# Patient Record
Sex: MALE | Race: AMERICAN INDIAN OR ALASKA NATIVE
[De-identification: names, ages, dates, MRNs, and addresses within clinical notes are randomized per-mention and may not be internally consistent; named-entity substitution may affect disease eponyms.]

---

## 2018-03-06 ENCOUNTER — HOSPITAL ENCOUNTER (EMERGENCY)
Dept: HOSPITAL 5 - ED | Age: 43
Discharge: HOME | End: 2018-03-06
Payer: SELF-PAY

## 2018-03-06 VITALS — DIASTOLIC BLOOD PRESSURE: 82 MMHG | SYSTOLIC BLOOD PRESSURE: 123 MMHG

## 2018-03-06 DIAGNOSIS — F17.200: ICD-10-CM

## 2018-03-06 DIAGNOSIS — H60.92: Primary | ICD-10-CM

## 2018-03-06 DIAGNOSIS — H66.92: ICD-10-CM

## 2018-03-06 PROCEDURE — 99282 EMERGENCY DEPT VISIT SF MDM: CPT

## 2018-03-06 NOTE — EMERGENCY DEPARTMENT REPORT
ED Fever HPI





- General


Chief Complaint: Fever


Stated Complaint: FLU LIKE SYMPTOMS


Time Seen by Provider: 03/06/18 19:22





- History of Present Illness


Initial Comments: 





Patient is a 42-year-old  male who is complaining of pain to 

his left ear.  Patient states he still started out with a mild cough 

nonproductive over the last several days his left ear is now hurting.  Patient 

is having 10 out of 10 pain he states that this some mild radiation into the 

left neck.  The patient denies any sore throat nausea vomiting or neck 

stiffness.


Fever Therapy PTA: cold remedies


Associated Symptoms: cough, diaphoresis.  denies: abdominal pain, chest pain, 

confusion, headache, muscle aches, nausea/vomiting, rash, shortness of breath, 

sore throat, stiff neck, syncope





ED Review of Systems


ROS: 


Stated complaint: FLU LIKE SYMPTOMS


Other details as noted in HPI





Comment: All other systems reviewed and negative





ED Past Medical Hx





- Past Medical History


Previous Medical History?: No





- Surgical History


Past Surgical History?: No





- Social History


Smoking Status: Current Every Day Smoker


Substance Use Type: Alcohol





- Medications


Home Medications: 


 Home Medications











 Medication  Instructions  Recorded  Confirmed  Last Taken  Type


 


Amoxicillin/K Clav Tab [Augmentin 1 each PO BID #14 tablet 03/06/18  Unknown Rx





875MG TAB]     


 


HYDROcodone/APAP 7.5-325 [Norco 1 each PO Q4HR PRN #15 tablet 03/06/18  Unknown 

Rx





7.5-325 mg TAB]     


 


Ibuprofen [Motrin 800 MG tab] 800 mg PO Q8HR #30 tablet 03/06/18  Unknown Rx


 


Neomy/Polymyx B/Hc Otic Susp 4 drops OTIC TID #1 bottle 03/06/18  Unknown Rx





[Cortisporin (Otic) Susp]     














ED Physical Exam





- General


Limitations: No Limitations


General appearance: alert, in no apparent distress





- Head


Head exam: Present: atraumatic, normocephalic





- Eye


Eye exam: Present: normal appearance





- ENT


ENT exam: Present: mucous membranes moist.  Absent: TM's normal bilaterally (

agents left TM and inner canal is swollen and erythematous and indurated)





- Neck


Neck exam: Present: normal inspection





- Respiratory


Respiratory exam: Present: normal lung sounds bilaterally.  Absent: respiratory 

distress





- Cardiovascular


Cardiovascular Exam: Present: regular rate, normal rhythm.  Absent: systolic 

murmur, diastolic murmur, rubs, gallop





- GI/Abdominal


GI/Abdominal exam: Present: soft, normal bowel sounds





- Rectal


Rectal exam: Present: deferred





- Extremities Exam


Extremities exam: Present: normal inspection





- Back Exam


Back exam: Present: normal inspection





- Neurological Exam


Neurological exam: Present: alert, oriented X3





- Psychiatric


Psychiatric exam: Present: normal affect, normal mood





- Skin


Skin exam: Present: warm, dry, intact, normal color.  Absent: rash





ED Course





 Vital Signs











  03/06/18





  18:33


 


Temperature 102.7 F H


 


Pulse Rate 88


 


Respiratory 20





Rate 


 


Blood Pressure 123/82


 


O2 Sat by Pulse 98





Oximetry 














ED Medical Decision Making





- Medical Decision Making





Patient is a 42-year-old black male who presented with fever.  Patient's lungs 

are clear R's O2 sat is within normal limits.  Believe that the fever is coming 

from the infected in her ear canal and otitis media.  Patient was started on 

Augmentin be given pain meds.


Critical care attestation.: 


If time is entered above; I have spent that time in minutes in the direct care 

of this critically ill patient, excluding procedure time.








ED Disposition


Clinical Impression: 


Otitis media


Qualifiers:


 Otitis media type: unspecified Chronicity: acute Qualified Code(s): H66.90 - 

Otitis media, unspecified, unspecified ear





Otitis externa


Qualifiers:


 Otitis externa type: other infective Chronicity: acute Laterality: left 

Qualified Code(s): H60.392 - Other infective otitis externa, left ear





Disposition: DC-01 TO HOME OR SELFCARE


Is pt being admited?: No


Does the pt Need Aspirin: No


Condition: Stable


Instructions:  Otitis Externa (ED), Otitis Media (ED)


Prescriptions: 


Amoxicillin/K Clav Tab [Augmentin 875MG TAB] 1 each PO BID #14 tablet


HYDROcodone/APAP 7.5-325 [Norco 7.5-325 mg TAB] 1 each PO Q4HR PRN #15 tablet


 PRN Reason: Pain


Ibuprofen [Motrin 800 MG tab] 800 mg PO Q8HR #30 tablet


Neomy/Polymyx B/Hc Otic Susp [Cortisporin (Otic) Susp] 4 drops OTIC TID #1 

bottle